# Patient Record
Sex: MALE | Race: WHITE | NOT HISPANIC OR LATINO | Employment: OTHER | URBAN - METROPOLITAN AREA
[De-identification: names, ages, dates, MRNs, and addresses within clinical notes are randomized per-mention and may not be internally consistent; named-entity substitution may affect disease eponyms.]

---

## 2018-08-30 ENCOUNTER — HOSPITAL ENCOUNTER (EMERGENCY)
Facility: HOSPITAL | Age: 43
Discharge: HOME | End: 2018-08-30
Attending: EMERGENCY MEDICINE

## 2018-08-30 VITALS
HEART RATE: 71 BPM | OXYGEN SATURATION: 98 % | SYSTOLIC BLOOD PRESSURE: 107 MMHG | TEMPERATURE: 97 F | HEIGHT: 73 IN | RESPIRATION RATE: 18 BRPM | WEIGHT: 175 LBS | BODY MASS INDEX: 23.19 KG/M2 | DIASTOLIC BLOOD PRESSURE: 66 MMHG

## 2018-08-30 DIAGNOSIS — R52 PAIN: ICD-10-CM

## 2018-08-30 DIAGNOSIS — M10.00 ACUTE IDIOPATHIC GOUT, UNSPECIFIED SITE: Primary | ICD-10-CM

## 2018-08-30 PROCEDURE — 25000003 PHARM REV CODE 250: Performed by: EMERGENCY MEDICINE

## 2018-08-30 PROCEDURE — 73610 X-RAY EXAM OF ANKLE: CPT | Mod: 26,LT,, | Performed by: RADIOLOGY

## 2018-08-30 PROCEDURE — 73610 X-RAY EXAM OF ANKLE: CPT | Mod: TC,FY,LT

## 2018-08-30 PROCEDURE — 99283 EMERGENCY DEPT VISIT LOW MDM: CPT

## 2018-08-30 RX ORDER — INDOMETHACIN 50 MG/1
50 CAPSULE ORAL
Qty: 15 CAPSULE | Refills: 0 | Status: SHIPPED | OUTPATIENT
Start: 2018-08-30 | End: 2019-08-21

## 2018-08-30 RX ORDER — HYDROCODONE BITARTRATE AND ACETAMINOPHEN 10; 325 MG/1; MG/1
1 TABLET ORAL
Status: COMPLETED | OUTPATIENT
Start: 2018-08-30 | End: 2018-08-30

## 2018-08-30 RX ORDER — TRAMADOL HYDROCHLORIDE 50 MG/1
50 TABLET ORAL EVERY 6 HOURS PRN
Qty: 12 TABLET | Refills: 0 | Status: SHIPPED | OUTPATIENT
Start: 2018-08-30 | End: 2018-09-09

## 2018-08-30 RX ADMIN — HYDROCODONE BITARTRATE AND ACETAMINOPHEN 1 TABLET: 10; 325 TABLET ORAL at 06:08

## 2018-08-30 NOTE — ED PROVIDER NOTES
Encounter Date: 8/30/2018       History     Chief Complaint   Patient presents with    Ankle Pain     Eight emerge from a chief complaint of the left ankle.  Patient is having severe pain x2 days worse today denies any falls or trauma such with his head is status swelling with pain to the touch patient stated was unable to walk secondary to pain. Patient stated pain is 10 on scale of 1-10.  Throbbing sensation nothing made it better worse with walking          Review of patient's allergies indicates:  No Known Allergies  History reviewed. No pertinent past medical history.  History reviewed. No pertinent surgical history.  No family history on file.  Social History     Tobacco Use    Smoking status: Current Every Day Smoker     Packs/day: 1.00     Types: Cigarettes   Substance Use Topics    Alcohol use: Yes     Comment: 2-3 quarts daily    Drug use: Not on file     Review of Systems   Constitutional: Negative for fever.   HENT: Negative for sore throat.    Respiratory: Negative for shortness of breath.    Cardiovascular: Negative for chest pain.   Gastrointestinal: Negative for nausea.   Genitourinary: Negative for dysuria.   Musculoskeletal: Positive for arthralgias and joint swelling. Negative for back pain.   Skin: Negative for rash.   Neurological: Negative for weakness.   Hematological: Does not bruise/bleed easily.   All other systems reviewed and are negative.      Physical Exam     Initial Vitals [08/30/18 0424]   BP Pulse Resp Temp SpO2   111/62 83 20 97.2 °F (36.2 °C) 98 %      MAP       --         Physical Exam    Nursing note and vitals reviewed.  Constitutional: Vital signs are normal. He appears well-developed and well-nourished. He appears cachectic. He is Obese .   HENT:   Head: Normocephalic and atraumatic.   Eyes: Lids are normal. Lids are everted and swept, no foreign bodies found.   Neck: Trachea normal, normal range of motion and phonation normal. Neck supple.   Cardiovascular: Normal rate,  regular rhythm and normal pulses.   Abdominal: Soft. Normal appearance and bowel sounds are normal.   Musculoskeletal: Normal range of motion.   Swelling tenderness noted to the left ankle slight erythema noted decreased range of motion secondary to pain. Other upper and lower extremities were within normal limits   Lymphadenopathy:        Head (right side): No submental, no tonsillar, no preauricular, no posterior auricular and no occipital adenopathy present.        Head (left side): No tonsillar, no preauricular and no posterior auricular adenopathy present.     He has no cervical adenopathy.        Right cervical: No deep cervical adenopathy present.       Left cervical: No superficial cervical adenopathy present.   Neurological: He is alert and oriented to person, place, and time. No cranial nerve deficit or sensory deficit. GCS eye subscore is 4. GCS verbal subscore is 5. GCS motor subscore is 6.   Skin: Skin is warm, dry and intact.   Psychiatric: He has a normal mood and affect. His speech is normal and behavior is normal. Cognition and memory are normal.         ED Course   Procedures  Labs Reviewed - No data to display       Imaging Results          X-Ray Ankle Complete Left (In process)               X-Rays:   Independently Interpreted Readings:   Other Readings:  X-ray of the left ankle was done independently read by me showing no acute process    Medical Decision Making:   Initial Assessment:   Patient emerged from which she complained of left ankle pain stated no trauma associated  Differential Diagnosis:   Fracture versus strain versus gout  Independently Interpreted Test(s):   I have ordered and independently interpreted X-rays - see prior notes.  Clinical Tests:   Radiological Study: Ordered and Reviewed  ED Management:  Patient's x-ray was negative for any acute fractures or dislocations patient was diagnosed with acute gout was placed on Indocin and Ultram for pain was to follow up with his family  doctor                      Clinical Impression:   The primary encounter diagnosis was Acute idiopathic gout, unspecified site. A diagnosis of Pain was also pertinent to this visit.                             Leo Puentes DO  08/30/18 0603       Leo Puentes DO  08/30/18 0603

## 2018-08-30 NOTE — ED NOTES
Pt here with left ankle pain x 2 days-reports may have twisted ankle at work. Redness and swelling noted to lateral ankle.

## 2019-08-21 ENCOUNTER — HOSPITAL ENCOUNTER (EMERGENCY)
Facility: HOSPITAL | Age: 44
Discharge: HOME | End: 2019-08-22
Attending: EMERGENCY MEDICINE

## 2019-08-21 VITALS
TEMPERATURE: 98 F | RESPIRATION RATE: 14 BRPM | HEART RATE: 83 BPM | WEIGHT: 175 LBS | BODY MASS INDEX: 23.19 KG/M2 | SYSTOLIC BLOOD PRESSURE: 117 MMHG | OXYGEN SATURATION: 95 % | DIASTOLIC BLOOD PRESSURE: 65 MMHG | HEIGHT: 73 IN

## 2019-08-21 DIAGNOSIS — S09.90XA FISH HOOK INJURY OF SCALP, INITIAL ENCOUNTER: Primary | ICD-10-CM

## 2019-08-21 PROCEDURE — 96372 THER/PROPH/DIAG INJ SC/IM: CPT

## 2019-08-21 PROCEDURE — 99284 EMERGENCY DEPT VISIT MOD MDM: CPT | Mod: 25

## 2019-08-21 PROCEDURE — 10120 INC&RMVL FB SUBQ TISS SMPL: CPT

## 2019-08-22 PROCEDURE — 90471 IMMUNIZATION ADMIN: CPT | Performed by: EMERGENCY MEDICINE

## 2019-08-22 PROCEDURE — 63600175 PHARM REV CODE 636 W HCPCS: Performed by: EMERGENCY MEDICINE

## 2019-08-22 PROCEDURE — 90714 TD VACC NO PRESV 7 YRS+ IM: CPT | Performed by: EMERGENCY MEDICINE

## 2019-08-22 PROCEDURE — 25000003 PHARM REV CODE 250: Performed by: EMERGENCY MEDICINE

## 2019-08-22 RX ORDER — ACETAMINOPHEN 325 MG/1
975 TABLET ORAL
Status: COMPLETED | OUTPATIENT
Start: 2019-08-22 | End: 2019-08-22

## 2019-08-22 RX ORDER — CEPHALEXIN 500 MG/1
500 CAPSULE ORAL EVERY 8 HOURS
Qty: 21 CAPSULE | Refills: 0 | Status: SHIPPED | OUTPATIENT
Start: 2019-08-22 | End: 2019-08-29

## 2019-08-22 RX ADMIN — TETANUS AND DIPHTHERIA TOXOIDS ADSORBED 0.5 ML: 2; 2 INJECTION INTRAMUSCULAR at 12:08

## 2019-08-22 RX ADMIN — ACETAMINOPHEN 975 MG: 325 TABLET ORAL at 12:08

## 2019-08-22 NOTE — DISCHARGE INSTRUCTIONS
Tetanus updated  Keflex 500mg 1 capsule three times daily  Return to the ER as needed for any acute worsening

## 2019-08-22 NOTE — ED TRIAGE NOTES
Pt to ed with treble hook to back of head. One aurora into skin. Pt attempted to remove hook at home and was unsuccessful.   No bleeding noted at this time. No acute signs of distress noted at this time.

## 2019-08-22 NOTE — ED NOTES
Dr Dixon at bedside. Fish hook removed from left head. Bleeding controlled with gauze. Pt tolerated well.

## 2019-08-22 NOTE — ED PROVIDER NOTES
Encounter Date: 8/21/2019       History     Chief Complaint   Patient presents with    Foreign Body in Skin     44-year-old male complains of imbedded fishhook in the left high parietal scalp  Was not fishing at the time of the injury in the past 2 hr  No Marine exposure of the hook in recent days            Review of patient's allergies indicates:  No Known Allergies  History reviewed. No pertinent past medical history.  History reviewed. No pertinent surgical history.  History reviewed. No pertinent family history.  Social History     Tobacco Use    Smoking status: Current Every Day Smoker     Packs/day: 0.50     Types: Cigarettes   Substance Use Topics    Alcohol use: Yes     Comment: beer today    Drug use: Yes     Types: Methamphetamines     Review of Systems   Constitutional: Negative.    HENT: Negative.    Respiratory: Negative.    Cardiovascular: Negative.    Musculoskeletal: Negative.    Skin: Positive for wound. Negative for color change, pallor and rash.   Neurological: Negative for headaches.   Hematological: Negative.    Psychiatric/Behavioral: Negative for confusion.   All other systems reviewed and are negative.      Physical Exam     Initial Vitals [08/21/19 2345]   BP Pulse Resp Temp SpO2   117/65 83 14 98.1 °F (36.7 °C) 95 %      MAP       --         Physical Exam    Nursing note and vitals reviewed.  Constitutional: He appears well-developed and well-nourished. He is not diaphoretic. No distress.   HENT:   Head: Normocephalic.       Cardiovascular: Normal rate, regular rhythm, normal heart sounds and intact distal pulses.   Pulmonary/Chest: Breath sounds normal.   Neurological: He is alert and oriented to person, place, and time. GCS score is 15. GCS eye subscore is 4. GCS verbal subscore is 5. GCS motor subscore is 6.   Skin: Skin is warm and dry. Capillary refill takes less than 2 seconds.   Psychiatric: He has a normal mood and affect. Thought content normal.         ED Course    Procedures  Labs Reviewed - No data to display       Imaging Results    None          Medical Decision Making:   ED Management:  Procedure - Embedded [place] removal   Prep Betadine  Local 1% via insulin syringe less than 0.5 cc  11 blade used to neck scalp skin on the inner curvature of the hook  Hook was backed out and found to be intact   EBL < 1cc  Tolerated well   Stable to discharge as per AVS                            Clinical Impression:       ICD-10-CM ICD-9-CM   1. Fish hook injury of scalp, initial encounter S09.90XA 959.09                                Dwight Dixon MD  08/22/19 0536

## 2020-02-24 ENCOUNTER — HOSPITAL ENCOUNTER (EMERGENCY)
Facility: HOSPITAL | Age: 45
Discharge: HOME | End: 2020-02-24

## 2020-02-24 VITALS
BODY MASS INDEX: 23.59 KG/M2 | OXYGEN SATURATION: 98 % | WEIGHT: 178 LBS | RESPIRATION RATE: 20 BRPM | HEART RATE: 92 BPM | HEIGHT: 73 IN | DIASTOLIC BLOOD PRESSURE: 70 MMHG | TEMPERATURE: 98 F | SYSTOLIC BLOOD PRESSURE: 109 MMHG

## 2020-02-24 DIAGNOSIS — S61.211A LACERATION OF LEFT INDEX FINGER WITHOUT FOREIGN BODY WITHOUT DAMAGE TO NAIL, INITIAL ENCOUNTER: Primary | ICD-10-CM

## 2020-02-24 PROCEDURE — 99283 EMERGENCY DEPT VISIT LOW MDM: CPT | Mod: 25

## 2020-02-24 PROCEDURE — 12001 RPR S/N/AX/GEN/TRNK 2.5CM/<: CPT

## 2020-02-24 PROCEDURE — 73140 XR FINGER 2 OR MORE VIEWS LEFT: ICD-10-PCS | Mod: 26,LT,, | Performed by: RADIOLOGY

## 2020-02-24 PROCEDURE — 73140 X-RAY EXAM OF FINGER(S): CPT | Mod: 26,LT,, | Performed by: RADIOLOGY

## 2020-02-24 PROCEDURE — 25000003 PHARM REV CODE 250: Performed by: NURSE PRACTITIONER

## 2020-02-24 PROCEDURE — 73140 X-RAY EXAM OF FINGER(S): CPT | Mod: TC,FY,LT

## 2020-02-24 RX ORDER — CEPHALEXIN 250 MG/1
250 CAPSULE ORAL 4 TIMES DAILY
Qty: 28 CAPSULE | Refills: 0 | Status: SHIPPED | OUTPATIENT
Start: 2020-02-24 | End: 2020-03-02

## 2020-02-24 RX ORDER — LIDOCAINE HYDROCHLORIDE 10 MG/ML
1 INJECTION INFILTRATION; PERINEURAL
Status: COMPLETED | OUTPATIENT
Start: 2020-02-24 | End: 2020-02-24

## 2020-02-24 RX ADMIN — LIDOCAINE HYDROCHLORIDE 1 ML: 10 INJECTION, SOLUTION INFILTRATION; PERINEURAL at 03:02

## 2020-02-24 NOTE — ED PROVIDER NOTES
Encounter Date: 2/24/2020       History     Chief Complaint   Patient presents with    Laceration     Everton Moore is a 44 y.o female male with no sign PMHx. He presents to ED with left #2 digit laceration     Patient reports that he cut his finger on metal just prior to arrival    He has a 1 cm superficial laceration to posterior aspect of proximal phalange. No active bleeding.    Patient with full range of motion of finger.    Distal finger with normal neurovascular status    Tetanus is current        Review of patient's allergies indicates:  No Known Allergies  No past medical history on file.  No past surgical history on file.  No family history on file.  Social History     Tobacco Use    Smoking status: Current Every Day Smoker     Packs/day: 0.50     Types: Cigarettes   Substance Use Topics    Alcohol use: Yes     Comment: beer today    Drug use: Yes     Types: Methamphetamines     Review of Systems   Constitutional: Negative.  Negative for fever.   HENT: Negative.  Negative for sore throat.    Eyes: Negative.    Respiratory: Negative.  Negative for shortness of breath.    Cardiovascular: Negative.  Negative for chest pain.   Gastrointestinal: Negative.  Negative for nausea.   Endocrine: Negative.    Genitourinary: Negative.  Negative for dysuria.   Musculoskeletal: Negative.  Negative for back pain.   Skin: Positive for wound. Negative for rash.   Allergic/Immunologic: Negative.    Neurological: Negative.  Negative for weakness.   Hematological: Negative.  Does not bruise/bleed easily.   Psychiatric/Behavioral: Negative.    All other systems reviewed and are negative.      Physical Exam     Initial Vitals [02/24/20 1453]   BP Pulse Resp Temp SpO2   109/70 92 20 97.5 °F (36.4 °C) 98 %      MAP       --         Physical Exam    Nursing note and vitals reviewed.  Constitutional: He appears well-developed and well-nourished. He is not diaphoretic. No distress.   Cardiovascular: Normal rate.    Pulmonary/Chest: Breath sounds normal.   Musculoskeletal: Normal range of motion. He exhibits tenderness.        Left hand: He exhibits tenderness and laceration. He exhibits normal range of motion, no bony tenderness, normal capillary refill, no deformity and no swelling.        Hands:  Neurological: He is alert and oriented to person, place, and time. GCS score is 15. GCS eye subscore is 4. GCS verbal subscore is 5. GCS motor subscore is 6.   Skin: Skin is warm. Capillary refill takes less than 2 seconds.   Psychiatric: He has a normal mood and affect. His behavior is normal. Judgment and thought content normal.         ED Course   Lac Repair  Date/Time: 2/24/2020 4:10 PM  Performed by: Eduarda Ortiz NP  Authorized by: Eduarda Ortiz NP   Location: left finger.  Laceration length: 1 cm  Foreign bodies: no foreign bodies  Tendon involvement: none  Nerve involvement: none  Vascular damage: no    Anesthesia:  Local Anesthetic: lidocaine 1% without epinephrine  Anesthetic total: 2.5 mL  Preparation: Patient was prepped and draped in the usual sterile fashion.  Irrigation solution: saline  Irrigation method: syringe  Debridement: none  Degree of undermining: none  Skin closure: Ethilon  Number of sutures: 2.  Technique: simple  Approximation: close  Approximation difficulty: simple  Dressing: dressing applied  Patient tolerance: Patient tolerated the procedure well with no immediate complications        Labs Reviewed - No data to display       Imaging Results          X-Ray Finger 2 or More Views Left (In process)                  Medical Decision Making:   Initial Assessment:   Patient with left #2 digit laceration     Patient reports that he cut his finger on metal just prior to arrival    He has a 1 cm superficial laceration to posterior aspect of proximal phalange. No active bleeding.    Patient with full range of motion of finger.    Distal finger with normal neurovascular status    Tetanus is  current    Differential Diagnosis:   Finger bone injury, foreign body  ED Management:  X-ray with no acute finding.    Wound closed with sutures    Discussed physical exam findings with patient  No acute emergent medical condition identified at this time to warrant further testing/diagnostics  At this time, I believe the patient is clinically stable for discharge.   Patient to follow up with PCP in 1-2 days.  The patient acknowledges that close follow up with a MD is required after all ER visits  Pt given instructions; take all medications prescribed in the ER as directed.   Patient agrees to comply with all instruction and direction given in the ER  Pt agrees to return to ER if any symptoms reoccur                                          Clinical Impression:       ICD-10-CM ICD-9-CM   1. Laceration of left index finger without foreign body without damage to nail, initial encounter S61.211A 883.0                             Eduarda Ortiz NP  02/24/20 1616

## 2020-02-24 NOTE — DISCHARGE INSTRUCTIONS
Sutures need removed in 7-10 days.    Monitor for infection    Return to emergency room symptoms worsen    Follow-up PCP in 2 days

## 2020-05-13 ENCOUNTER — HOSPITAL ENCOUNTER (EMERGENCY)
Facility: HOSPITAL | Age: 45
Discharge: HOME | End: 2020-05-13
Attending: EMERGENCY MEDICINE

## 2020-05-13 VITALS
HEART RATE: 76 BPM | TEMPERATURE: 98 F | DIASTOLIC BLOOD PRESSURE: 63 MMHG | OXYGEN SATURATION: 100 % | RESPIRATION RATE: 18 BRPM | WEIGHT: 180 LBS | BODY MASS INDEX: 24.38 KG/M2 | HEIGHT: 72 IN | SYSTOLIC BLOOD PRESSURE: 116 MMHG

## 2020-05-13 DIAGNOSIS — L23.7 ALLERGIC CONTACT DERMATITIS DUE TO PLANTS, EXCEPT FOOD: Primary | ICD-10-CM

## 2020-05-13 PROCEDURE — 99284 EMERGENCY DEPT VISIT MOD MDM: CPT | Mod: 25

## 2020-05-13 RX ORDER — HYDROXYZINE HYDROCHLORIDE 25 MG/1
25 TABLET, FILM COATED ORAL EVERY 6 HOURS PRN
Qty: 20 TABLET | Refills: 0 | Status: SHIPPED | OUTPATIENT
Start: 2020-05-13

## 2020-05-13 RX ORDER — METHYLPREDNISOLONE 4 MG/1
TABLET ORAL
Qty: 1 PACKAGE | Refills: 0 | Status: SHIPPED | OUTPATIENT
Start: 2020-05-13 | End: 2021-09-16 | Stop reason: CLARIF

## 2020-05-13 NOTE — ED PROVIDER NOTES
Encounter Date: 5/13/2020       History     Chief Complaint   Patient presents with    Rash     The patient is a 44-year-old male.  He has no diagnosed chronic medical conditions.  He presents with itching and swelling to his face and arms for three days after coming into contact with poison oak.  He reports minimal improvement with applying calamine lotion.  He also took Benadryl one which provider relief.    The history is provided by the patient. No  was used.     Review of patient's allergies indicates:  No Known Allergies  No past medical history on file.  No past surgical history on file.  No family history on file.  Social History     Tobacco Use    Smoking status: Current Every Day Smoker     Packs/day: 0.50     Types: Cigarettes   Substance Use Topics    Alcohol use: Yes     Comment: beer today    Drug use: Yes     Types: Methamphetamines     Review of Systems   Constitutional: Negative for chills and fever.   Respiratory: Negative for shortness of breath.    Gastrointestinal: Negative for abdominal pain, diarrhea, nausea and vomiting.   Skin:        + rash, pruritus   Neurological: Negative for dizziness, light-headedness and headaches.       Physical Exam     Initial Vitals [05/13/20 0313]   BP Pulse Resp Temp SpO2   116/63 76 18 98 °F (36.7 °C) 100 %      MAP       --         Physical Exam    Nursing note and vitals reviewed.  Constitutional: He appears well-developed and well-nourished. He is not diaphoretic. No distress.   HENT:   Head: Normocephalic and atraumatic.   Mouth/Throat: Oropharynx is clear and moist.   Pulmonary/Chest: No respiratory distress.   Neurological: He is alert and oriented to person, place, and time. GCS score is 15. GCS eye subscore is 4. GCS verbal subscore is 5. GCS motor subscore is 6.   Skin: Skin is warm and dry. No pallor.   Scattered rash to face and upper extremities consisting of well demarcated plaques and macules.  No vesicles.  No  desquamation.  No tenderness.         ED Course   Procedures  Labs Reviewed - No data to display       Imaging Results    None                                          Clinical Impression:       ICD-10-CM ICD-9-CM   1. Allergic contact dermatitis due to plants, except food L23.7 692.6         Disposition:   Disposition: Discharged  Condition: Stable                        Fracisco Callahan III, MD  05/20/20 9058

## 2020-05-13 NOTE — ED TRIAGE NOTES
Male pt presented to the ed via pov with complaints of exposure to poison oak three days ago. Rash started 2 days ago and continue to get worse, per pt it is spreading all over his body. Pt also complains of facial swelling. Pt denies any pain or shortness of breath. Vss.

## 2020-12-01 ENCOUNTER — OCCUPATIONAL HEALTH (OUTPATIENT)
Dept: URGENT CARE | Facility: CLINIC | Age: 45
End: 2020-12-01

## 2020-12-01 PROCEDURE — 80305 DRUG TEST PRSMV DIR OPT OBS: CPT | Mod: S$GLB,,, | Performed by: EMERGENCY MEDICINE

## 2020-12-01 PROCEDURE — 80305 PR NON-DOT DRUG SCREENS: ICD-10-PCS | Mod: S$GLB,,, | Performed by: EMERGENCY MEDICINE

## 2021-09-16 ENCOUNTER — HOSPITAL ENCOUNTER (EMERGENCY)
Facility: HOSPITAL | Age: 46
Discharge: HOME | End: 2021-09-16
Attending: EMERGENCY MEDICINE

## 2021-09-16 VITALS
DIASTOLIC BLOOD PRESSURE: 56 MMHG | HEIGHT: 72 IN | WEIGHT: 175 LBS | HEART RATE: 80 BPM | OXYGEN SATURATION: 97 % | BODY MASS INDEX: 23.7 KG/M2 | TEMPERATURE: 98 F | SYSTOLIC BLOOD PRESSURE: 111 MMHG | RESPIRATION RATE: 20 BRPM
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
## 2021-09-16 PROCEDURE — 99281 EMR DPT VST MAYX REQ PHY/QHP: CPT

## 2021-09-16 RX ORDER — DOXYCYCLINE 100 MG/1
100 CAPSULE ORAL 2 TIMES DAILY
Qty: 20 CAPSULE | Refills: 0 | Status: SHIPPED | OUTPATIENT
Start: 2021-09-16 | End: 2021-09-26

## 2021-09-16 RX ORDER — METHYLPREDNISOLONE 4 MG/1
TABLET ORAL
Qty: 1 PACKAGE | Refills: 0 | Status: SHIPPED | OUTPATIENT
Start: 2021-09-16 | End: 2021-10-07